# Patient Record
Sex: FEMALE | Race: WHITE | NOT HISPANIC OR LATINO | Employment: OTHER | URBAN - NONMETROPOLITAN AREA
[De-identification: names, ages, dates, MRNs, and addresses within clinical notes are randomized per-mention and may not be internally consistent; named-entity substitution may affect disease eponyms.]

---

## 2017-10-10 ENCOUNTER — OFFICE VISIT (OUTPATIENT)
Dept: CARDIOLOGY | Facility: CLINIC | Age: 62
End: 2017-10-10

## 2017-10-10 VITALS
WEIGHT: 136 LBS | SYSTOLIC BLOOD PRESSURE: 118 MMHG | BODY MASS INDEX: 22.66 KG/M2 | DIASTOLIC BLOOD PRESSURE: 70 MMHG | HEART RATE: 92 BPM | HEIGHT: 65 IN

## 2017-10-10 DIAGNOSIS — I27.20 PULMONARY HYPERTENSION, MILD (HCC): ICD-10-CM

## 2017-10-10 DIAGNOSIS — I36.1 NON-RHEUMATIC TRICUSPID VALVE INSUFFICIENCY: ICD-10-CM

## 2017-10-10 DIAGNOSIS — E03.9 ACQUIRED HYPOTHYROIDISM: ICD-10-CM

## 2017-10-10 DIAGNOSIS — R01.1 HEART MURMUR: Primary | ICD-10-CM

## 2017-10-10 DIAGNOSIS — I27.20 PULMONARY HYPERTENSION (HCC): ICD-10-CM

## 2017-10-10 PROCEDURE — 93000 ELECTROCARDIOGRAM COMPLETE: CPT | Performed by: INTERNAL MEDICINE

## 2017-10-10 PROCEDURE — 99214 OFFICE O/P EST MOD 30 MIN: CPT | Performed by: INTERNAL MEDICINE

## 2017-10-10 RX ORDER — LEVOTHYROXINE SODIUM 0.07 MG/1
75 TABLET ORAL DAILY
Refills: 5 | COMMUNITY
Start: 2017-09-25

## 2017-10-10 NOTE — PROGRESS NOTES
Tavia Rao  [id number]  1955  62 y.o.  female    Referring Provider: TAMRA Ramirez    Reason for Follow-up Visit: Heart murmur  Subjective .   Overall feeling well   No chest pain or shortness of breath   No palpitations  Compliant with medications  Good effort tolerance  Very active    History of present illness:  Tavia Rao is a 62 y.o. yo female with history of heart murmur who presents today for   Chief Complaint   Patient presents with   • Heart Murmur     1 yr f/u   • Pulmonary Hypertension   .    History  Past Medical History:   Diagnosis Date   • Arthritis    • Disease of thyroid gland    • Dyslipidemia    • Dyspnea on effort    • Heart murmur    • Hyperlipidemia    • Hypertension    • Hypothyroidism    • Osteoarthritis    • Pulmonary hypertension, mild    ,   Past Surgical History:   Procedure Laterality Date   • COSMETIC SURGERY      eye lid lift   • ECTOPIC PREGNANCY SURGERY     ,   Family History   Problem Relation Age of Onset   • Heart disease Father    • Hypertension Father    • No Known Problems Mother    ,   Social History   Substance Use Topics   • Smoking status: Never Smoker   • Smokeless tobacco: Never Used   • Alcohol use Yes      Comment: occ   ,     Medications  Current Outpatient Prescriptions   Medication Sig Dispense Refill   • levothyroxine (SYNTHROID, LEVOTHROID) 75 MCG tablet Take 75 mcg by mouth Daily.  5     No current facility-administered medications for this visit.        Allergies:  Review of patient's allergies indicates no known allergies.    Review of Systems  Review of Systems   Constitution: Negative.   HENT: Negative.    Eyes: Negative.    Cardiovascular: Negative for chest pain, claudication, cyanosis, dyspnea on exertion, irregular heartbeat, leg swelling, near-syncope, orthopnea, palpitations, paroxysmal nocturnal dyspnea and syncope.   Respiratory: Negative.    Endocrine: Negative.    Hematologic/Lymphatic: Negative.    Skin: Negative.   "  Gastrointestinal: Negative for anorexia.   Genitourinary: Negative.    Neurological: Negative.    Psychiatric/Behavioral: Negative.        Objective     Physical Exam:  /70  Pulse 92  Ht 65\" (165.1 cm)  Wt 136 lb (61.7 kg)  BMI 22.63 kg/m2  Physical Exam   Constitutional: She appears well-developed.   HENT:   Head: Normocephalic.   Neck: Normal carotid pulses and no JVD present. No tracheal tenderness present. Carotid bruit is not present. No tracheal deviation and no edema present.   Cardiovascular: Regular rhythm and normal pulses.    Murmur heard.   Systolic murmur is present with a grade of 2/6   Pulmonary/Chest: Effort normal. No stridor.   Abdominal: Soft.   Neurological: She is alert. She has normal strength. No cranial nerve deficit or sensory deficit.   Skin: Skin is warm.   Psychiatric: She has a normal mood and affect. Her speech is normal and behavior is normal.       Results Review:       ECG 12 Lead  Date/Time: 10/10/2017 9:18 AM  Performed by: SERGIO MCCONNELL  Authorized by: SERGIO MCCONNELL   Comparison: compared with previous ECG from 10/10/2016  Comparison to previous ECG: Non specific STT changes  Rhythm: sinus rhythm  Rate: normal  Conduction: conduction normal  T Waves: T waves normal  QRS axis: normal  Clinical impression: abnormal ECG and non-specific ECG            Assessment/Plan   Patient Active Problem List   Diagnosis   • Acquired hypothyroidism   • Heart murmur   • Pulmonary hypertension   • Non-rheumatic tricuspid valve insufficiency   • Pulmonary hypertension, mild resolved       Stable doing well. No chest pain or excessive dyspnea. No palpitations. No significant pedal edema. Compliant with medications and diet. Latest labs and medications reviewed.  Echo 10/25/16 [facility] showed no pulmonary hypertension normal LVEF    Plan:     Close follow up with you as scheduled.  Intensive factor modifications.  See order list.    Counseled regarding disease appropriate diet, " fluid, caffeine, stimulants and sodium intake as well as importance of compliance to diet, exercise and regular follow up.  Avoid NSAIDS and COX2 inhibitors. Use Acetaminophen PRN.   No additional cardiac testing required at this point in time.   See me PRN per wishes of patient    Return if symptoms worsen or fail to improve.

## 2019-07-23 ENCOUNTER — OFFICE VISIT (OUTPATIENT)
Dept: SURGERY | Age: 64
End: 2019-07-23
Payer: MEDICARE

## 2019-07-23 VITALS — TEMPERATURE: 98.6 F | BODY MASS INDEX: 21.21 KG/M2 | WEIGHT: 132 LBS | HEIGHT: 66 IN

## 2019-07-23 DIAGNOSIS — R92.0 MICROCALCIFICATIONS OF THE BREAST: Primary | ICD-10-CM

## 2019-07-23 PROCEDURE — 4040F PNEUMOC VAC/ADMIN/RCVD: CPT | Performed by: PHYSICIAN ASSISTANT

## 2019-07-23 PROCEDURE — 1036F TOBACCO NON-USER: CPT | Performed by: PHYSICIAN ASSISTANT

## 2019-07-23 PROCEDURE — 99203 OFFICE O/P NEW LOW 30 MIN: CPT | Performed by: PHYSICIAN ASSISTANT

## 2019-07-23 PROCEDURE — 3014F SCREEN MAMMO DOC REV: CPT | Performed by: PHYSICIAN ASSISTANT

## 2019-07-23 PROCEDURE — 1123F ACP DISCUSS/DSCN MKR DOCD: CPT | Performed by: PHYSICIAN ASSISTANT

## 2019-07-23 PROCEDURE — G8420 CALC BMI NORM PARAMETERS: HCPCS | Performed by: PHYSICIAN ASSISTANT

## 2019-07-23 PROCEDURE — 3017F COLORECTAL CA SCREEN DOC REV: CPT | Performed by: PHYSICIAN ASSISTANT

## 2019-07-23 PROCEDURE — G8427 DOCREV CUR MEDS BY ELIG CLIN: HCPCS | Performed by: PHYSICIAN ASSISTANT

## 2019-07-23 PROCEDURE — 1090F PRES/ABSN URINE INCON ASSESS: CPT | Performed by: PHYSICIAN ASSISTANT

## 2019-07-23 PROCEDURE — G8400 PT W/DXA NO RESULTS DOC: HCPCS | Performed by: PHYSICIAN ASSISTANT

## 2019-07-29 ENCOUNTER — HOSPITAL ENCOUNTER (OUTPATIENT)
Dept: WOMENS IMAGING | Age: 64
Discharge: HOME | End: 2019-07-29
Payer: MEDICARE

## 2019-07-29 DIAGNOSIS — R92.0 MICROCALCIFICATIONS OF THE BREAST: ICD-10-CM

## 2019-07-29 PROCEDURE — 19081 BX BREAST 1ST LESION STRTCTC: CPT | Performed by: SURGERY

## 2019-07-29 PROCEDURE — 88305 TISSUE EXAM BY PATHOLOGIST: CPT

## 2019-07-29 PROCEDURE — 2720000010 MAM STEREO BREAST BX W LOC DEVICE 1ST LESION LEFT

## 2019-07-29 PROCEDURE — 77065 DX MAMMO INCL CAD UNI: CPT

## 2019-07-30 ENCOUNTER — TELEPHONE (OUTPATIENT)
Dept: SURGERY | Age: 64
End: 2019-07-30

## 2019-08-08 NOTE — PROGRESS NOTES
Subjective:      Patient ID: Jen Bright is a 71 y.o. female. HPI  Ms. Maninder Peoples presents to the office today with microcalcifications in her left breast.  Biopsy is recommended. Jen Bright is a 71 y.o. female with the following history as [facility] in [facility]: There are no active problems to display for this patient. Current Outpatient Medications   Medication Sig Dispense Refill    calcium-vitamin D (OSCAL) 250-125 MG-UNIT per tablet Take 1 tablet by mouth daily      levothyroxine (SYNTHROID) 25 MCG tablet       levothyroxine (SYNTHROID) 50 MCG tablet        No current facility-administered medications for this visit. Allergies: Patient has no known allergies. History reviewed. No pertinent past medical history. Past Surgical History:   Procedure Laterality Date    BLEPHAROPLASTY Bilateral     ECTOPIC PREGNANCY SURGERY  1992    TUBAL LIGATION      also reversed      Family History   Problem Relation Age of Onset    Heart Disease Father      Social History     Tobacco Use    Smoking status: Never Smoker    Smokeless tobacco: Never Used   Substance Use Topics    Alcohol use: Yes     Comment: occasional       Review of Systems   All other systems reviewed and are negative. Objective:   Physical Exam   Constitutional: She is oriented to person, place, and time. She appears well-developed and well-nourished. No distress. HENT:   Head: Normocephalic and atraumatic. Eyes: Pupils are equal, round, and reactive to light. EOM are normal.   Pulmonary/Chest: Right breast exhibits no inverted nipple, no mass, no nipple discharge, no skin change and no tenderness. Left breast exhibits no inverted nipple, no mass, no nipple discharge, no skin change and no tenderness. Neurological: She is alert and oriented to person, place, and time. Skin: Skin is warm and dry. She is not diaphoretic. Psychiatric: She has a normal mood and affect.  Her behavior is normal. Judgment and thought

## 2019-08-12 ENCOUNTER — OFFICE VISIT (OUTPATIENT)
Dept: SURGERY | Age: 64
End: 2019-08-12
Payer: MEDICARE

## 2019-08-12 VITALS — WEIGHT: 130 LBS | HEIGHT: 66 IN | TEMPERATURE: 98.6 F | BODY MASS INDEX: 20.89 KG/M2

## 2019-08-12 DIAGNOSIS — R92.0 MICROCALCIFICATIONS OF THE BREAST: Primary | ICD-10-CM

## 2019-08-12 PROCEDURE — G8400 PT W/DXA NO RESULTS DOC: HCPCS | Performed by: PHYSICIAN ASSISTANT

## 2019-08-12 PROCEDURE — 3017F COLORECTAL CA SCREEN DOC REV: CPT | Performed by: PHYSICIAN ASSISTANT

## 2019-08-12 PROCEDURE — 1090F PRES/ABSN URINE INCON ASSESS: CPT | Performed by: PHYSICIAN ASSISTANT

## 2019-08-12 PROCEDURE — 1036F TOBACCO NON-USER: CPT | Performed by: PHYSICIAN ASSISTANT

## 2019-08-12 PROCEDURE — 99212 OFFICE O/P EST SF 10 MIN: CPT | Performed by: PHYSICIAN ASSISTANT

## 2019-08-12 PROCEDURE — 4040F PNEUMOC VAC/ADMIN/RCVD: CPT | Performed by: PHYSICIAN ASSISTANT

## 2019-08-12 PROCEDURE — 3014F SCREEN MAMMO DOC REV: CPT | Performed by: PHYSICIAN ASSISTANT

## 2019-08-12 PROCEDURE — G8427 DOCREV CUR MEDS BY ELIG CLIN: HCPCS | Performed by: PHYSICIAN ASSISTANT

## 2019-08-12 PROCEDURE — 1123F ACP DISCUSS/DSCN MKR DOCD: CPT | Performed by: PHYSICIAN ASSISTANT

## 2019-08-12 PROCEDURE — G8420 CALC BMI NORM PARAMETERS: HCPCS | Performed by: PHYSICIAN ASSISTANT

## 2019-08-13 NOTE — PROGRESS NOTES
HISTORY OF PRESENT ILLNESS:    Ms. Inna Sun comes in today for 2 week follow-up after uncomplicated  Left  stereotactic breast biopsy. Pathology demonstrated benign microcalcifications    She has appropriate postoperative discomfort, and no significant complaints. PHYSICAL EXAM:  Her wound is well healed with no evidence of infection or hematoma. IMPRESSION:  benign microcalcifications    PLAN:  I'll need to see her back in 6 months for  unilateral left mammogram.    Over 50% of visit was spent counseling patient. 10 minutes of face to face time with patient.

## 2020-01-30 ENCOUNTER — HOSPITAL ENCOUNTER (OUTPATIENT)
Dept: WOMENS IMAGING | Age: 65
Discharge: HOME | End: 2020-01-30
Payer: MEDICARE

## 2020-01-30 ENCOUNTER — OFFICE VISIT (OUTPATIENT)
Dept: SURGERY | Age: 65
End: 2020-01-30
Payer: MEDICARE

## 2020-01-30 PROCEDURE — G8400 PT W/DXA NO RESULTS DOC: HCPCS | Performed by: PHYSICIAN ASSISTANT

## 2020-01-30 PROCEDURE — G8427 DOCREV CUR MEDS BY ELIG CLIN: HCPCS | Performed by: PHYSICIAN ASSISTANT

## 2020-01-30 PROCEDURE — G8420 CALC BMI NORM PARAMETERS: HCPCS | Performed by: PHYSICIAN ASSISTANT

## 2020-01-30 PROCEDURE — 1123F ACP DISCUSS/DSCN MKR DOCD: CPT | Performed by: PHYSICIAN ASSISTANT

## 2020-01-30 PROCEDURE — 99213 OFFICE O/P EST LOW 20 MIN: CPT | Performed by: PHYSICIAN ASSISTANT

## 2020-01-30 PROCEDURE — G0279 TOMOSYNTHESIS, MAMMO: HCPCS

## 2020-01-30 PROCEDURE — G8484 FLU IMMUNIZE NO ADMIN: HCPCS | Performed by: PHYSICIAN ASSISTANT

## 2020-01-30 PROCEDURE — 4040F PNEUMOC VAC/ADMIN/RCVD: CPT | Performed by: PHYSICIAN ASSISTANT

## 2020-01-30 PROCEDURE — 1036F TOBACCO NON-USER: CPT | Performed by: PHYSICIAN ASSISTANT

## 2020-01-30 PROCEDURE — 1090F PRES/ABSN URINE INCON ASSESS: CPT | Performed by: PHYSICIAN ASSISTANT

## 2020-01-30 PROCEDURE — 3017F COLORECTAL CA SCREEN DOC REV: CPT | Performed by: PHYSICIAN ASSISTANT

## 2020-01-30 PROCEDURE — G9899 SCRN MAM PERF RSLTS DOC: HCPCS | Performed by: PHYSICIAN ASSISTANT

## 2020-01-30 NOTE — PROGRESS NOTES
HPI:  Vineet Greenfield is in for 6 month follow-up breast check following benign breast biopsy. She has not noticed any changes in her breasts. EXAMINATION:  [facility] DIGITAL DIAGNOSTIC UNILATERAL LEFT  1/30/2020 10:33   AM   HISTORY: Six-month follow-up benign left breast biopsy   COMPARISON: Previous mammograms dated 7/17/2019 and 7/29/2019   TECHNIQUE:    Multi projection 2-D and 3-D left breast mammography performed. FINDINGS:    Breast composition/density: Category B, scattered dense glandular   tissue   Localization clip identified in the inferior medial left breast.   There are no dominant masses. There are no suspicious calcifications. There are no developing densities or areas of architectural   distortion. Bilateral 6 month follow-up recommended or earlier if clinically   indicated.       Impression   1. Changes from benign left breast biopsy. No malignant features. 2. ACR BI-RADS Category 2, benign findings. BREAST EXAM:  On examination, she has fibrocystic changes throughout both breasts, no dominant masses, no skin or nipple changes and no axillary adenopathy. I see nothing suspicious for breast cancer. ASSESSMENT:  Benign fibrocystic changes              PLAN:  I will plan to see her back in 6 months for physical exam and bilateral mammograms. She will contact me if anything significant changes. 15 minutes spent,  which includes face to face with patient, record review, evaluation, planning, and education.

## 2020-08-03 ENCOUNTER — OFFICE VISIT (OUTPATIENT)
Dept: SURGERY | Age: 65
End: 2020-08-03
Payer: MEDICARE

## 2020-08-03 ENCOUNTER — HOSPITAL ENCOUNTER (OUTPATIENT)
Dept: WOMENS IMAGING | Age: 65
Discharge: HOME | End: 2020-08-03
Payer: MEDICARE

## 2020-08-03 VITALS
BODY MASS INDEX: 21.53 KG/M2 | WEIGHT: 134 LBS | TEMPERATURE: 97.8 F | HEART RATE: 63 BPM | HEIGHT: 66 IN | OXYGEN SATURATION: 99 %

## 2020-08-03 PROCEDURE — 3017F COLORECTAL CA SCREEN DOC REV: CPT | Performed by: PHYSICIAN ASSISTANT

## 2020-08-03 PROCEDURE — 4040F PNEUMOC VAC/ADMIN/RCVD: CPT | Performed by: PHYSICIAN ASSISTANT

## 2020-08-03 PROCEDURE — G8420 CALC BMI NORM PARAMETERS: HCPCS | Performed by: PHYSICIAN ASSISTANT

## 2020-08-03 PROCEDURE — 1090F PRES/ABSN URINE INCON ASSESS: CPT | Performed by: PHYSICIAN ASSISTANT

## 2020-08-03 PROCEDURE — G8400 PT W/DXA NO RESULTS DOC: HCPCS | Performed by: PHYSICIAN ASSISTANT

## 2020-08-03 PROCEDURE — 99213 OFFICE O/P EST LOW 20 MIN: CPT | Performed by: PHYSICIAN ASSISTANT

## 2020-08-03 PROCEDURE — 1123F ACP DISCUSS/DSCN MKR DOCD: CPT | Performed by: PHYSICIAN ASSISTANT

## 2020-08-03 PROCEDURE — 77063 BREAST TOMOSYNTHESIS BI: CPT

## 2020-08-03 PROCEDURE — G9899 SCRN MAM PERF RSLTS DOC: HCPCS | Performed by: PHYSICIAN ASSISTANT

## 2020-08-03 PROCEDURE — 1036F TOBACCO NON-USER: CPT | Performed by: PHYSICIAN ASSISTANT

## 2020-08-03 PROCEDURE — G8427 DOCREV CUR MEDS BY ELIG CLIN: HCPCS | Performed by: PHYSICIAN ASSISTANT

## 2020-08-13 ENCOUNTER — TELEPHONE (OUTPATIENT)
Dept: SURGERY | Age: 65
End: 2020-08-13

## 2020-08-13 NOTE — TELEPHONE ENCOUNTER
Called patient and advised we have not received last ANGEL imagining or report from hospital in [address] [place] [address] yet. Once we do it will be reviewed and compared to recent 8/2020 imaging and we will call her with result, per [facility].

## 2020-08-20 NOTE — PROGRESS NOTES
HPI:  Simi Garcia is in for 6 month follow-up breast check following a benign breast biopsy. She has not noticed any changes in her breasts. This note is being dictated on 8/20/2020 after waiting 17 days for the mammogram report. It is still not completed. Apparently the [facility] is still waiting to get her prior films from [facility]:  On examination, she has fibrocystic changes throughout both breasts, no dominant masses, no skin or nipple changes and no axillary adenopathy. I see nothing suspicious for breast cancer. ASSESSMENT:  Normal exam             PLAN:  Further recommendations are pending the mammogram report. If the mammogram is normal, I am anticipating her being back on a annual screening schedule for bilateral mammography and breast exams. She will contact me if anything significant changes. 15 minutes spent, which includes face to face with patient, record review, evaluation, planning, and education. I spent over 50% of this visit counseling patient.

## 2020-08-21 ENCOUNTER — TELEPHONE (OUTPATIENT)
Dept: SURGERY | Age: 65
End: 2020-08-21

## 2020-08-21 NOTE — TELEPHONE ENCOUNTER
Left message on Kristie's VM, to see if films from [place] have been received yet per [facility].

## 2021-03-02 ENCOUNTER — TRANSCRIBE ORDERS (OUTPATIENT)
Dept: ADMINISTRATIVE | Facility: HOSPITAL | Age: 66
End: 2021-03-02

## 2021-03-02 DIAGNOSIS — R20.2 PARESTHESIA: Primary | ICD-10-CM

## 2021-03-08 ENCOUNTER — APPOINTMENT (OUTPATIENT)
Dept: NEUROLOGY | Facility: HOSPITAL | Age: 66
End: 2021-03-08

## 2021-03-16 ENCOUNTER — HOSPITAL ENCOUNTER (OUTPATIENT)
Dept: NEUROLOGY | Facility: HOSPITAL | Age: 66
Discharge: HOME | End: 2021-03-16
Admitting: INTERNAL MEDICINE

## 2021-03-16 DIAGNOSIS — R20.2 PARESTHESIA: ICD-10-CM

## 2021-03-16 PROCEDURE — 95886 MUSC TEST DONE W/N TEST COMP: CPT

## 2021-03-16 PROCEDURE — 95910 NRV CNDJ TEST 7-8 STUDIES: CPT
